# Patient Record
Sex: FEMALE | Race: WHITE | NOT HISPANIC OR LATINO | URBAN - NONMETROPOLITAN AREA
[De-identification: names, ages, dates, MRNs, and addresses within clinical notes are randomized per-mention and may not be internally consistent; named-entity substitution may affect disease eponyms.]

---

## 2017-08-22 PROBLEM — H52.223: Noted: 2017-08-22

## 2017-08-22 PROBLEM — H16.223: Noted: 2017-08-22

## 2017-08-22 PROBLEM — H52.4: Noted: 2017-08-22

## 2017-08-22 PROBLEM — H52.13: Noted: 2017-08-22

## 2019-01-22 ENCOUNTER — IMPORTED ENCOUNTER (OUTPATIENT)
Dept: URBAN - NONMETROPOLITAN AREA CLINIC 1 | Facility: CLINIC | Age: 54
End: 2019-01-22

## 2019-01-22 PROCEDURE — S0621 ROUTINE OPHTHALMOLOGICAL EXA: HCPCS

## 2019-01-22 NOTE — PATIENT DISCUSSION
Dry Eye Syndrome OU-  discussed findings w/patient-  patient currently using Refresh PRN OU-  continue current regimen if patient has more difficulty she can call or come in and we can Rx drops-  RTC 1 year or prnSimple Myopia OD/Compound Myopic Astigmatism OS w/Presbyopia-  discussed findings w/patient-  new spectacle Rx issued-  continue to monitor yearly or prn; 's Notes: MR 1/22/2019DFE 1/22/2019

## 2020-03-10 ENCOUNTER — IMPORTED ENCOUNTER (OUTPATIENT)
Dept: URBAN - NONMETROPOLITAN AREA CLINIC 1 | Facility: CLINIC | Age: 55
End: 2020-03-10

## 2020-03-10 PROCEDURE — 99213 OFFICE O/P EST LOW 20 MIN: CPT

## 2020-03-10 PROCEDURE — 92015 DETERMINE REFRACTIVE STATE: CPT

## 2020-03-10 NOTE — PATIENT DISCUSSION
Dry Eye Syndrome OU-  discussed findings w/patient-  patient currently using Refresh PRN OU-  patient continues to do very well w/current treatment-  continue to monitor yearly or prnSimple Myopia OD/Compound Simple Astigmatism OS w/Presbyopia-  discussed findings w/patient-  new spectacle Rx issued-  continue to monitor yearly or prn; 's Notes: MR 3/10/2020DFE 3/10/2020

## 2021-03-12 ENCOUNTER — IMPORTED ENCOUNTER (OUTPATIENT)
Dept: URBAN - NONMETROPOLITAN AREA CLINIC 1 | Facility: CLINIC | Age: 56
End: 2021-03-12

## 2021-03-12 PROCEDURE — 92015 DETERMINE REFRACTIVE STATE: CPT

## 2021-03-12 PROCEDURE — 99214 OFFICE O/P EST MOD 30 MIN: CPT

## 2022-04-09 ASSESSMENT — TONOMETRY
OS_IOP_MMHG: 15
OD_IOP_MMHG: 17
OD_IOP_MMHG: 15
OD_IOP_MMHG: 15
OS_IOP_MMHG: 15
OS_IOP_MMHG: 17

## 2022-04-09 ASSESSMENT — VISUAL ACUITY
OS_SC: 20/20
OS_CC: 20/20
OD_GLARE: 20/20-
OS_GLARE: 20/25-
OD_CC: 20/20
OS_SC: 20/25-
OS_CC: 20/20
OD_CC: 20/20-
OD_SC: 20/20

## 2022-05-16 ENCOUNTER — COMPREHENSIVE EXAM (OUTPATIENT)
Dept: URBAN - NONMETROPOLITAN AREA CLINIC 4 | Facility: CLINIC | Age: 57
End: 2022-05-16

## 2022-05-16 DIAGNOSIS — H52.13: ICD-10-CM

## 2022-05-16 PROCEDURE — S0621 ROUTINE OPHTHALMOLOGICAL EXA: HCPCS

## 2022-05-16 ASSESSMENT — VISUAL ACUITY
OS_CC: 20/20
OD_CC: 20/20

## 2022-05-16 ASSESSMENT — TONOMETRY
OS_IOP_MMHG: 15
OD_IOP_MMHG: 16

## 2024-07-16 ENCOUNTER — COMPREHENSIVE EXAM (OUTPATIENT)
Dept: URBAN - NONMETROPOLITAN AREA CLINIC 4 | Facility: CLINIC | Age: 59
End: 2024-07-16

## 2024-07-16 DIAGNOSIS — H16.223: ICD-10-CM

## 2024-07-16 DIAGNOSIS — H52.4: ICD-10-CM

## 2024-07-16 DIAGNOSIS — H25.13: ICD-10-CM

## 2024-07-16 DIAGNOSIS — H52.13: ICD-10-CM

## 2024-07-16 DIAGNOSIS — H52.223: ICD-10-CM

## 2024-07-16 PROCEDURE — S0621AEC ROUTINE OPH EXAM INCLUDES REF/ EST PATIENT

## 2024-07-16 ASSESSMENT — VISUAL ACUITY
OS_SC: 20/30-2
OD_SC: 20/20-1
OU_SC: 20/20

## 2024-07-16 ASSESSMENT — TONOMETRY
OS_IOP_MMHG: 16
OD_IOP_MMHG: 16